# Patient Record
Sex: FEMALE | Race: WHITE | URBAN - METROPOLITAN AREA
[De-identification: names, ages, dates, MRNs, and addresses within clinical notes are randomized per-mention and may not be internally consistent; named-entity substitution may affect disease eponyms.]

---

## 2017-01-19 ENCOUNTER — POST MORTEM DOCUMENTATION (OUTPATIENT)
Dept: TRANSPLANT | Facility: CLINIC | Age: 72
End: 2017-01-19

## 2017-01-19 NOTE — PROGRESS NOTES
Received notification of patient's death from Dany Cespedes.  Place of death was reported as home.  Graft status at the time of death was reported as Functioning.  Additional information: Patient with diagnosis of metastatic liver cancer choosing to hospice over chemotherapy.  TIS verification is:complete